# Patient Record
Sex: FEMALE | Race: WHITE | NOT HISPANIC OR LATINO | Employment: OTHER | URBAN - METROPOLITAN AREA
[De-identification: names, ages, dates, MRNs, and addresses within clinical notes are randomized per-mention and may not be internally consistent; named-entity substitution may affect disease eponyms.]

---

## 2021-05-11 ENCOUNTER — TRANSCRIBE ORDERS (OUTPATIENT)
Dept: ADMINISTRATIVE | Facility: HOSPITAL | Age: 86
End: 2021-05-11

## 2021-05-11 DIAGNOSIS — J38.00 PARALYZED VOCAL CORDS: Primary | ICD-10-CM

## 2021-05-14 ENCOUNTER — HOSPITAL ENCOUNTER (OUTPATIENT)
Dept: CT IMAGING | Facility: HOSPITAL | Age: 86
Discharge: HOME | End: 2021-05-14
Payer: MEDICARE

## 2021-05-14 DIAGNOSIS — J38.00 PARALYZED VOCAL CORDS: ICD-10-CM

## 2021-05-14 PROCEDURE — 70491 CT SOFT TISSUE NECK W/DYE: CPT

## 2021-05-14 RX ADMIN — IOHEXOL 85 ML: 350 INJECTION, SOLUTION INTRAVENOUS at 10:33

## 2023-11-02 ENCOUNTER — APPOINTMENT (EMERGENCY)
Dept: CT IMAGING | Facility: HOSPITAL | Age: 88
End: 2023-11-02
Payer: MEDICARE

## 2023-11-02 ENCOUNTER — HOSPITAL ENCOUNTER (EMERGENCY)
Facility: HOSPITAL | Age: 88
Discharge: HOME | End: 2023-11-02
Attending: EMERGENCY MEDICINE | Admitting: EMERGENCY MEDICINE
Payer: MEDICARE

## 2023-11-02 VITALS
HEART RATE: 63 BPM | DIASTOLIC BLOOD PRESSURE: 78 MMHG | TEMPERATURE: 97.8 F | RESPIRATION RATE: 16 BRPM | HEIGHT: 61 IN | WEIGHT: 140 LBS | OXYGEN SATURATION: 96 % | SYSTOLIC BLOOD PRESSURE: 170 MMHG | BODY MASS INDEX: 26.43 KG/M2
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
## 2023-11-02 DIAGNOSIS — S09.90XA INJURY OF HEAD, INITIAL ENCOUNTER: ICD-10-CM

## 2023-11-02 LAB
ABO GROUP BLD: NORMAL
ANION GAP SERPL CALCULATED.3IONS-SCNC: 5 MMOL/L
BASOPHILS # BLD AUTO: 0.06 THOUSANDS/ÂΜL (ref 0–0.1)
BASOPHILS NFR BLD AUTO: 1 % (ref 0–1)
BLD GP AB SCN SERPL QL: NEGATIVE
BUN SERPL-MCNC: 22 MG/DL (ref 5–25)
CALCIUM SERPL-MCNC: 8.6 MG/DL (ref 8.4–10.2)
CARDIAC TROPONIN I PNL SERPL HS: 5 NG/L
CHLORIDE SERPL-SCNC: 103 MMOL/L (ref 96–108)
CO2 SERPL-SCNC: 28 MMOL/L (ref 21–32)
CREAT SERPL-MCNC: 0.95 MG/DL (ref 0.6–1.3)
EOSINOPHIL # BLD AUTO: 0.15 THOUSAND/ÂΜL (ref 0–0.61)
EOSINOPHIL NFR BLD AUTO: 3 % (ref 0–6)
ERYTHROCYTE [DISTWIDTH] IN BLOOD BY AUTOMATED COUNT: 12.4 % (ref 11.6–15.1)
GFR SERPL CREATININE-BSD FRML MDRD: 54 ML/MIN/1.73SQ M
GLUCOSE SERPL-MCNC: 85 MG/DL (ref 65–140)
HCT VFR BLD AUTO: 36.7 % (ref 34.8–46.1)
HGB BLD-MCNC: 12.2 G/DL (ref 11.5–15.4)
IMM GRANULOCYTES # BLD AUTO: 0.02 THOUSAND/UL (ref 0–0.2)
IMM GRANULOCYTES NFR BLD AUTO: 0 % (ref 0–2)
LYMPHOCYTES # BLD AUTO: 0.63 THOUSANDS/ÂΜL (ref 0.6–4.47)
LYMPHOCYTES NFR BLD AUTO: 11 % (ref 14–44)
MCH RBC QN AUTO: 30.2 PG (ref 26.8–34.3)
MCHC RBC AUTO-ENTMCNC: 33.2 G/DL (ref 31.4–37.4)
MCV RBC AUTO: 91 FL (ref 82–98)
MONOCYTES # BLD AUTO: 0.64 THOUSAND/ÂΜL (ref 0.17–1.22)
MONOCYTES NFR BLD AUTO: 11 % (ref 4–12)
NEUTROPHILS # BLD AUTO: 4.4 THOUSANDS/ÂΜL (ref 1.85–7.62)
NEUTS SEG NFR BLD AUTO: 74 % (ref 43–75)
NRBC BLD AUTO-RTO: 0 /100 WBCS
PLATELET # BLD AUTO: 215 THOUSANDS/UL (ref 149–390)
PMV BLD AUTO: 9.6 FL (ref 8.9–12.7)
POTASSIUM SERPL-SCNC: 4.3 MMOL/L (ref 3.5–5.3)
RBC # BLD AUTO: 4.04 MILLION/UL (ref 3.81–5.12)
RH BLD: NEGATIVE
SODIUM SERPL-SCNC: 136 MMOL/L (ref 135–147)
SPECIMEN EXPIRATION DATE: NORMAL
WBC # BLD AUTO: 5.9 THOUSAND/UL (ref 4.31–10.16)

## 2023-11-02 PROCEDURE — 70450 CT HEAD/BRAIN W/O DYE: CPT

## 2023-11-02 PROCEDURE — 71260 CT THORAX DX C+: CPT

## 2023-11-02 PROCEDURE — 99285 EMERGENCY DEPT VISIT HI MDM: CPT | Performed by: EMERGENCY MEDICINE

## 2023-11-02 PROCEDURE — 72125 CT NECK SPINE W/O DYE: CPT

## 2023-11-02 PROCEDURE — 86850 RBC ANTIBODY SCREEN: CPT | Performed by: EMERGENCY MEDICINE

## 2023-11-02 PROCEDURE — 74177 CT ABD & PELVIS W/CONTRAST: CPT

## 2023-11-02 PROCEDURE — 84484 ASSAY OF TROPONIN QUANT: CPT | Performed by: EMERGENCY MEDICINE

## 2023-11-02 PROCEDURE — 36415 COLL VENOUS BLD VENIPUNCTURE: CPT | Performed by: EMERGENCY MEDICINE

## 2023-11-02 PROCEDURE — 86901 BLOOD TYPING SEROLOGIC RH(D): CPT | Performed by: EMERGENCY MEDICINE

## 2023-11-02 PROCEDURE — 85025 COMPLETE CBC W/AUTO DIFF WBC: CPT | Performed by: EMERGENCY MEDICINE

## 2023-11-02 PROCEDURE — 99284 EMERGENCY DEPT VISIT MOD MDM: CPT

## 2023-11-02 PROCEDURE — 86900 BLOOD TYPING SEROLOGIC ABO: CPT | Performed by: EMERGENCY MEDICINE

## 2023-11-02 PROCEDURE — 80048 BASIC METABOLIC PNL TOTAL CA: CPT | Performed by: EMERGENCY MEDICINE

## 2023-11-02 RX ORDER — PRAVASTATIN SODIUM 40 MG
40 TABLET ORAL DAILY
COMMUNITY

## 2023-11-02 RX ORDER — TRAMADOL HYDROCHLORIDE 50 MG/1
50 TABLET ORAL EVERY 6 HOURS PRN
COMMUNITY

## 2023-11-02 RX ORDER — ASPIRIN 81 MG/1
81 TABLET ORAL DAILY
COMMUNITY

## 2023-11-02 RX ORDER — ALPRAZOLAM 0.25 MG/1
0.25 TABLET ORAL
COMMUNITY

## 2023-11-02 RX ADMIN — IOHEXOL 100 ML: 350 INJECTION, SOLUTION INTRAVENOUS at 13:45

## 2023-11-02 NOTE — DISCHARGE INSTRUCTIONS
Thank you for choosing the emergency department at [facility]. We appreciated the opportunity and privilege to address your healthcare needs. We remain available to you should you require additional evaluation or assistance. We value your feedback and would appreciate the opportunity to address anything you identified as an opportunity to improve or where we excelled. If there are colleagues who deserve special recognition, please let us know! We hope you are feeling better soon! Please also note that sometimes there are subtle abnormalities in your lab values that you may observe when you access your record online. These are frequently not worrisome and if they are of concern we will have discussed them with you. However, we always encourage that you discuss any concerns you may have or observe on your record with your primary care provider. Please also be aware that voice transcription will occasionally recognize words or grammar differently than what was spoken.

## 2023-11-02 NOTE — ED PROVIDER NOTES
Emergency Department Trauma Note  Josy Bertrand 82 y.o. female MRN: [id number]  Unit/Bed#: ED 11/ED 11 Encounter: [id number]      Trauma Alert: Trauma Acuity: Trauma Evaluation  Model of Arrival:   via    Trauma Team: Current Providers  Attending Provider: Paco Stone DO  Registered Nurse: Maury Metzger RN  Consultants:     None      History of Present Illness     Chief Complaint:   Chief Complaint   Patient presents with    Fall     PT. Voiced she fell yesterday down one step and hit her head on ground after tripping down one step. Pt. Voiced she does not feel right in her head      HPI:  Josy Bertrand is a 82 y.o. female who presents with . Mechanism:           Patient is an 79-year-old female brought to the emergency room for evaluation after having suffered a fall yesterday. Patient apparently was outside her home when she slipped on the steps and fell on her left side. She struck the left side of her head. She struck her left chest.  Patient has been ambulatory but today she did not "feel right."  In conversations with her friend she was brought to urgent care who referred the patient to the emergency room. Patient was ambulatory into the department. Patient is on no anticoagulant therapy. History provided by:  Patient  Fall  Mechanism of injury: fall    Injury location:  Head/neck and torso  Head/neck injury location:  Head  Torso injury location:  L chest  Incident location:  Home  Fall:     Fall occurred:  Down stairs    Impact surface:  Grass    Point of impact:  Head  Suspicion of alcohol use: no    Suspicion of drug use: no    Associated symptoms: chest pain (Left lateral chest)    Associated symptoms: no back pain, no headaches, no nausea and no vomiting    Risk factors: no anticoagulation therapy      Review of Systems   Constitutional:  Positive for activity change. HENT: Negative. Respiratory:  Negative for chest tightness.     Cardiovascular:  Positive for chest pain (Left lateral chest). Gastrointestinal: Negative. Negative for diarrhea, nausea and vomiting. Musculoskeletal:  Negative for arthralgias, back pain, gait problem and joint swelling. Neurological:  Positive for weakness. Negative for dizziness, syncope, light-headedness, numbness and headaches. All other systems reviewed and are negative. Historical Information     Immunizations:   Immunization History   Administered Date(s) Administered    COVID-19 MODERNA VACC 0.5 ML IM 02/05/2021, 03/05/2021, 11/05/2021, 04/22/2022    COVID-19 Moderna Vac BIVALENT 12 Yr+ IM 0.5 ML 10/28/2022       Past Medical History:   Diagnosis Date    Anxiety     Disease of thyroid gland      History reviewed. No pertinent family history. Past Surgical History:   Procedure Laterality Date    CT NEEDLE BIOPSY LUNG  5/9/2018     Social History     Tobacco Use    Smoking status: Never    Smokeless tobacco: Never   Substance Use Topics    Alcohol use: Not Currently    Drug use: Never     E-Cigarette/Vaping     E-Cigarette/Vaping Substances       Family History: non-contributory    Meds/Allergies   Prior to Admission Medications   Prescriptions Last Dose Informant Patient Reported? Taking?    ALPRAZolam (XANAX) 0.25 mg tablet   Yes No   Sig: Take 0.25 mg by mouth daily at bedtime as needed for anxiety   Ascorbic Acid 500 MG CHEW   Yes No   Sig: Chew 500 mg in the morning   aspirin (ECOTRIN LOW STRENGTH) 81 mg EC tablet   Yes No   Sig: Take 81 mg by mouth daily   pravastatin (PRAVACHOL) 40 mg tablet   Yes No   Sig: Take 40 mg by mouth daily   traMADol (ULTRAM) 50 mg tablet   Yes No   Sig: Take 50 mg by mouth every 6 (six) hours as needed for moderate pain      Facility-Administered Medications: None       No Known Allergies    PHYSICAL EXAM      Objective   Vitals:   First set: Temperature: 97.8 °F (36.6 °C) (11/02/23 1315)  Pulse: 60 (11/02/23 1315)  Respirations: 18 (11/02/23 1315)  Blood Pressure: (!) 186/78 (11/02/23 1315)  SpO2: 98 % (11/02/23 1315)    Primary Survey:   (A) Airway: Normal  (B) Breathing: Normal  (C) Circulation: Pulses:   normal  (D) Disabliity:  GCS Total:  15  (E) Expose:  Completed    Secondary Survey: (Click on Physical Exam tab above)  Physical Exam  Vitals and nursing note reviewed. Constitutional:       General: She is awake. She is not in acute distress. Appearance: Normal appearance. She is well-developed. She is not ill-appearing or toxic-appearing. HENT:      Head: Normocephalic. Contusion present. Right Ear: External ear normal.      Left Ear: External ear normal.      Nose: Nose normal.      Mouth/Throat:      Mouth: Mucous membranes are moist.   Eyes:      General: Lids are normal. No scleral icterus. Extraocular Movements: Extraocular movements intact. Pupils: Pupils are equal, round, and reactive to light. Cardiovascular:      Rate and Rhythm: Normal rate and regular rhythm. Heart sounds: Normal heart sounds. No murmur heard. Pulmonary:      Effort: Pulmonary effort is normal. No tachypnea or respiratory distress. Breath sounds: Normal breath sounds. No decreased air movement. No wheezing, rhonchi or rales. Chest:      Chest wall: Tenderness present. No crepitus. Abdominal:      General: Abdomen is flat. There is no distension. Palpations: Abdomen is soft. Tenderness: There is no abdominal tenderness. There is no guarding or rebound. Musculoskeletal:         General: No swelling, tenderness or deformity. Normal range of motion. Cervical back: Normal range of motion and neck supple. Skin:     General: Skin is warm and dry. Coloration: Skin is not jaundiced or pale. Findings: No rash. Neurological:      Mental Status: She is alert and oriented to person, place, and time. Mental status is at baseline. Cranial Nerves: No cranial nerve deficit. Motor: No weakness.    Psychiatric:         Attention and Perception: Attention normal.         Mood and Affect: Mood normal.         Speech: Speech normal.         Behavior: Behavior normal.         Cervical spine cleared by clinical criteria?  Yes     Invasive Devices       Peripheral Intravenous Line  Duration             Peripheral IV 11/02/23 Right Antecubital <1 day                    Lab Results:   Results Reviewed       Procedure Component Value Units Date/Time    HS Troponin 0hr (reflex protocol) [[id number]]  (Normal) Collected: 11/02/23 1337    Lab Status: Final result Specimen: Blood from Arm, Right Updated: 11/02/23 1406     hs TnI 0hr 5 ng/L     HS Troponin I 2hr [[id number]]     Lab Status: No result Specimen: Blood     Basic metabolic panel [[id number]] Collected: 11/02/23 1337    Lab Status: Final result Specimen: Blood from Arm, Right Updated: 11/02/23 1357     Sodium 136 mmol/L      Potassium 4.3 mmol/L      Chloride 103 mmol/L      CO2 28 mmol/L      ANION GAP 5 mmol/L      BUN 22 mg/dL      Creatinine 0.95 mg/dL      Glucose 85 mg/dL      Calcium 8.6 mg/dL      eGFR 54 ml/min/1.73sq m     Narrative:      [place] guidelines for Chronic Kidney Disease (CKD):     Stage 1 with normal or high GFR (GFR > 90 mL/min/1.73 square meters)    Stage 2 Mild CKD (GFR = 60-89 mL/min/1.73 square meters)    Stage 3A Moderate CKD (GFR = 45-59 mL/min/1.73 square meters)    Stage 3B Moderate CKD (GFR = 30-44 mL/min/1.73 square meters)    Stage 4 Severe CKD (GFR = 15-29 mL/min/1.73 square meters)    Stage 5 End Stage CKD (GFR <15 mL/min/1.73 square meters)  Note: GFR calculation is accurate only with a steady state creatinine    CBC and differential [[id number]]  (Abnormal) Collected: 11/02/23 1337    Lab Status: Final result Specimen: Blood from Arm, Right Updated: 11/02/23 1343     WBC 5.90 Thousand/uL      RBC 4.04 Million/uL      Hemoglobin 12.2 g/dL      Hematocrit 36.7 %      MCV 91 fL      MCH 30.2 pg      MCHC 33.2 g/dL      RDW 12.4 %      MPV 9.6 fL      Platelets 215 Thousands/uL      nRBC 0 /100 WBCs      Neutrophils Relative 74 %      Immat GRANS % 0 %      Lymphocytes Relative 11 %      Monocytes Relative 11 %      Eosinophils Relative 3 %      Basophils Relative 1 %      Neutrophils Absolute 4.40 Thousands/µL      Immature Grans Absolute 0.02 Thousand/uL      Lymphocytes Absolute 0.63 Thousands/µL      Monocytes Absolute 0.64 Thousand/µL      Eosinophils Absolute 0.15 Thousand/µL      Basophils Absolute 0.06 Thousands/µL                    Imaging Studies:   Direct to CT: Yes  TRAUMA - CT head wo contrast   Final Result by Jourdan Quezada MD (11/02 1405)      No acute intracranial abnormality. Workstation performed: [id number]         TRAUMA - CT spine cervical wo contrast   Final Result by Jourdan Quezada MD (11/02 1410)      No cervical spine fracture or traumatic malalignment. Workstation performed: [id number]         TRAUMA - CT chest abdomen pelvis w contrast   Final Result by Jourdan Quezada MD (11/02 1425)      No evidence of acute traumatic injury within the chest abdomen or pelvis            Workstation performed: [id number]         CT recon only thoracolumbar (no charge)   Final Result by Jourdan Quezada MD (11/02 1428)      No fracture or traumatic subluxation. Workstation performed: [id number]               Procedures  Procedures         ED Course  ED Course as of 11/02/23 1458   Thu Nov 02, 2023   1416 CT of the head and neck are both negative for any acute process. 1428 CT of the chest abdomen pelvis are likewise negative. 1457 No thoracic lumbar fracture. Medical Decision Making  CXR was deferred as patient had bilateral breath sounds and this appeared to be mostly an isolated head injury. Pelvic plain film was deferred as patient was ambulatory and had no pelvic pain    Patient deemed stable to proceed to any necessary CT imaging.     Cervical spine was cleared imaging and clinical exam.    Patient presented to the emergency department and a MSE was performed. The patient was evaluated for complaint related to acute traumatic injury. Patient is potentially at risk for, but not limited to, acute head injury including intracranial hemorrhage, subdural or epidural hematoma, cervical spine injury, other spine injury, chest trauma such as pneumothorax, pulmonary contusion, or cardiac contusion, abdominal injury such as liver hematoma, spleen hematoma, kidney hematoma, or other musculoskeletal injury. Several of these diagnoses have been evaluated and ruled out by history and physical.  As needed, patient will be further evaluated with laboratory and imaging studies. Higher level diagnostics, such as CT imaging or ultrasound, may also be required. Please see work-up portion of the note for further evaluation of patient's risk. Socioeconomic factors were also considered as part of the decision-making process. Unless otherwise stated in the chart or patient is admitted as elsewhere documented, any previously prescribed medications will be maintained. Problems Addressed:  Fall, initial encounter: complicated acute illness or injury  Injury of head, initial encounter: complicated acute illness or injury with systemic symptoms that poses a threat to life or bodily functions    Amount and/or Complexity of Data Reviewed  Labs: ordered. Radiology: ordered. Risk  Prescription drug management. Decision regarding hospitalization. Disposition  [facility] Transfer: No  Final diagnoses:   Fall, initial encounter   Injury of head, initial encounter     Time reflects when diagnosis was documented in both MDM as applicable and the Disposition within this note       Time User Action Codes Description Comment    11/2/2023  2:57 PM Berry Wasserman Add [K29. CGWA] Fall, initial encounter     11/2/2023  2:57 PM Linh Lemus Add [V52.88SB] Injury of head, initial encounter           ED Disposition ED Disposition   Discharge    Condition   Stable    Date/Time   Thu Nov 2, 2023  2:57 PM    Comment   Hunter Quezada discharge to home/self care. Follow-up Information       Follow up With Specialties Details Why Contact Info    Doris Hinkle DO Family Medicine In 1 week As needed [address]  [phone]            Current Discharge Medication List        CONTINUE these medications which have NOT CHANGED    Details   ALPRAZolam (XANAX) 0.25 mg tablet Take 0.25 mg by mouth daily at bedtime as needed for anxiety      Ascorbic Acid 500 MG CHEW Chew 500 mg in the morning      aspirin (ECOTRIN LOW STRENGTH) 81 mg EC tablet Take 81 mg by mouth daily      pravastatin (PRAVACHOL) 40 mg tablet Take 40 mg by mouth daily      traMADol (ULTRAM) 50 mg tablet Take 50 mg by mouth every 6 (six) hours as needed for moderate pain           No discharge procedures on file.     PDMP Review       None            ED Provider  Electronically Signed by           Michele Guillen DO  11/02/23 7346

## 2025-08-14 ENCOUNTER — HOSPITAL ENCOUNTER (OUTPATIENT)
Dept: NON INVASIVE DIAGNOSTICS | Facility: HOSPITAL | Age: 89
Discharge: HOME | End: 2025-08-14
Attending: FAMILY MEDICINE
Payer: MEDICARE